# Patient Record
Sex: FEMALE | Race: WHITE
[De-identification: names, ages, dates, MRNs, and addresses within clinical notes are randomized per-mention and may not be internally consistent; named-entity substitution may affect disease eponyms.]

---

## 2017-07-19 ENCOUNTER — HOSPITAL ENCOUNTER (OUTPATIENT)
Dept: HOSPITAL 45 - C.RADBC | Age: 72
Discharge: HOME | End: 2017-07-19
Attending: NURSE PRACTITIONER
Payer: COMMERCIAL

## 2017-07-19 DIAGNOSIS — Z87.09: Primary | ICD-10-CM

## 2017-07-19 DIAGNOSIS — R05: ICD-10-CM

## 2017-07-19 NOTE — DIAGNOSTIC IMAGING REPORT
CHEST 2 VIEWS ROUTINE



CLINICAL HISTORY: 72 years-old Female presenting with COUGH, RECENT URI. 



TECHNIQUE: PA and lateral views of the chest were obtained.



COMPARISON:  9/19/2016.



FINDINGS:

S shaped scoliotic curvature of the thoracolumbar spine resulting in stable

curvature of the cardiomediastinal silhouette. Surgical clips project over the

right lower lung. Lungs and pleural spaces clear. Lungs mildly hyperinflated.

Radiodensities projecting over the epigastrium may be external to the patient.



IMPRESSION:

1.  No acute cardiopulmonary disease.







Electronically signed by:  Jacob Raymond M.D.

7/19/2017 12:03 PM



Dictated Date/Time:  7/19/2017 12:00 PM

## 2018-03-09 ENCOUNTER — HOSPITAL ENCOUNTER (EMERGENCY)
Dept: HOSPITAL 45 - C.EDB | Age: 73
Discharge: HOME | End: 2018-03-09
Payer: COMMERCIAL

## 2018-03-09 VITALS
HEART RATE: 86 BPM | DIASTOLIC BLOOD PRESSURE: 64 MMHG | TEMPERATURE: 98.6 F | SYSTOLIC BLOOD PRESSURE: 138 MMHG | OXYGEN SATURATION: 99 %

## 2018-03-09 VITALS
WEIGHT: 128.97 LBS | BODY MASS INDEX: 21.75 KG/M2 | WEIGHT: 128.97 LBS | HEIGHT: 64.49 IN | HEIGHT: 64.49 IN | BODY MASS INDEX: 21.75 KG/M2

## 2018-03-09 DIAGNOSIS — Z87.891: ICD-10-CM
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
## 2018-03-09 DIAGNOSIS — Z98.890: ICD-10-CM

## 2018-03-09 DIAGNOSIS — S00.03XA: ICD-10-CM

## 2018-03-09 DIAGNOSIS — S92.354A: Primary | ICD-10-CM

## 2018-03-09 DIAGNOSIS — Z85.3: ICD-10-CM

## 2018-03-09 NOTE — EMERGENCY ROOM VISIT NOTE
ED Visit Note


First contact with patient:  08:07


I have seen and examined this patient with Kaila Amaro and generally agree with 

the treatment plan as discussed.


Problem List


Medical Problems:


(1) Breast cancer


Status: Chronic  





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


Permanent Comment: Soft detected right breast mass


Status post biopsy revealing lobular carcinoma status post lumpectomy and 

sentinel lymph node biopsy stage pT2 pN1aM0 


Estrogen receptor positive, progesterone receptor positive, HER-2/gerardo equivocal


Dose dense chemotherapy 4 cycles of Adriamycin and Cytoxan followed by 4 cycles 

of Taxol treated with Herceptin that continued until February 2015


Status post completion of radiation therapy 06/09/2014 received 5040 cGy








Status: Resolved  





(3) Sinus tachycardia


Status: Chronic  





(4) tenderness back


Status: Chronic  











Current/Historical Medications


Scheduled


, 600 MG PO QAM


Anastrozole (Arimidex), 1 MG PO DAILY


Biotin (Biotin), 5,000 MCG PO QAM


Diltiazem Hcl Coated Beads (Diltiazem Cd), 240 MG PO QAM


Lisinopril (Lisinopril), 20 MG PO DAILY


Multivitamins/Minerals (Mvi With Minerals), 1 TAB PO QAM





Scheduled PRN


Acyclovir (Acyclovir), 1 TAB PO TID PRN


Ibuprofen (Motrin), 600 MG PO TID PRN for Pain


Lorazepam (Ativan), 0.5 MG PO TID PRN for Anxiety





Allergies


Coded Allergies:  


     Bacitracin (Verified  Allergy, Intermediate, erythema, 3/9/18)


     Neomycin (Verified  Allergy, Intermediate, erythema, 3/9/18)


     Polymyxin B (Verified  Allergy, Intermediate, erythema, 3/9/18)


     Codeine (Verified  Allergy, Unknown, ., 3/9/18)


     Adhesives (Verified  Adverse Reaction, Intermediate, PRURITIS, 3/9/18)





Vital Signs











  Date Time  Temp Pulse Resp B/P (MAP) Pulse Ox O2 Delivery O2 Flow Rate FiO2


 


3/9/18 08:02 37.0 86 16 138/64 99 Room Air  











Departure Information


Referrals


Sonny Rosales M.D. (PCP)





Patient Instructions


My [facility]

## 2018-03-09 NOTE — EMERGENCY ROOM VISIT NOTE
History


First contact with patient:  08:07


Chief Complaint:  FALL


Stated Complaint:  FELL AND TWISTED ANKLE, HIT HEAD





History of Present Illness


The patient is a 73 year old female who presents to the Emergency Room with 

complaints of having a fall just prior to arrival.  The patient states that she 

missed the bottom step and twisted her right ankle.  She states she fell down 

and the right side of her head hit her rocking chair.  The patient denies any 

dizziness or visual changes prior to the fall.  The patient denies any loss of 

consciousness.  The patient currently denies any headache, dizziness or visual 

changes.  The patient denies any neck or back pain.  The patient denies any 

head pain.  She is mainly complaining of right ankle pain.  She denies any 

numbness and tingling in her toes.  The patient denies any knee or hip injury.  

The patient takes a baby aspirin daily.  She is not on any other blood thinners.





Review of Systems


10 system review was performed and was negative unless stated otherwise history 

of present illness.





Past Medical/Surgical History


Medical Problems:


(1) Bilateral breast cancer


(2) Breast cancer


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


(4) Sinus tachycardia


(5) tenderness back








Social History


Smoking Status:  Former Smoker





Current/Historical Medications


Scheduled


, 600 MG PO QAM


Anastrozole (Arimidex), 1 MG PO DAILY


Biotin (Biotin), 5,000 MCG PO QAM


Diltiazem Hcl Coated Beads (Diltiazem Cd), 240 MG PO QAM


Lisinopril (Lisinopril), 20 MG PO DAILY


Multivitamins/Minerals (Mvi With Minerals), 1 TAB PO QAM





Scheduled PRN


Acyclovir (Acyclovir), 1 TAB PO TID PRN


Ibuprofen (Motrin), 600 MG PO TID PRN for Pain


Lorazepam (Ativan), 0.5 MG PO TID PRN for Anxiety





Physical Exam


Vital Signs











  Date Time  Temp Pulse Resp B/P (MAP) Pulse Ox O2 Delivery O2 Flow Rate FiO2


 


3/9/18 08:02 37.0 86 16 138/64 99 Room Air  











Physical Exam


GENERAL: 73-year-old white female appears in no acute distress.


MENTAL Status: Alert and oriented 3.


HEAD: Small area of ecchymosis noted to frontal to temporal region.  Remainder 

of head is unremarkable.


EYES: PERRLA.  EOMs intact.


EARS: Canals clear.  TMs without hemotympanum


NECK: Supple, no lymphadenopathy noted.  No carotid bruits noted.


LUNGS: Clear auscultation without wheezes rales or rhonchi.


CARDIAC: Regular rate and rhythm without murmur.  Pulses is full and equal 

throughout.


SPINE: Entire spine nontender to palpation.


NEURO:Cranial nerves two through 12 intact. Cerebellar function intact with 

finger-to-nose. Fine motor intact with alternating finger motions.


RIGHT ANKLE: No gross bony deformity noted.  Edema noted over the lateral 

malleoli where she is tender to palpation.  She is also tender to palpation 

over the anterior aspect of the ankle joint.  Limited range of motion secondary 

to pain.  Sensation is intact.





Medical Decision & Procedures


ER Provider


Diagnostic Interpretation:


RIGHT ANKLE 3 VIEWS





HISTORY: Right ankle pain.  Fall/ankle injury





COMPARISON: None.





FINDINGS: Mildly displaced fracture at the distal shaft of the fifth metatarsal.


Small old avulsion fracture at the dorsal aspect of the anterior talus. The


distal tibia and fibula are intact. Small plantar heel spur. Soft tissue


swelling within the ankle. No dislocation.     No radiopaque foreign bodies.





IMPRESSION:  





1. Mildly displaced fracture at the distal shaft of the fifth metatarsal.


2. No acute fracture or dislocation within the right ankle.


3. Small old avulsion fracture at the dorsal aspect of the anterior talus.











Electronically signed by:  Joesph Huerta M.D.


3/9/2018 8:47 AM








CT SCAN OF THE BRAIN WITHOUT IV CONTRAST





CLINICAL HISTORY: Fall.





COMPARISON STUDY:  No priors.





TECHNIQUE: Unenhanced axial CT scan of the brain is performed from the vertex to


the skull base. A dose lowering technique was utilized adhering to the


principles of ALARA. 





CT DOSE: 614.27 mGy.cm





FINDINGS:





Brain parenchyma: There are age-related involutional changes noting  minimal


subcortical and periventricular microangiopathic change. There is no hemorrhage,


mass effect, or evidence of acute territorial ischemia by CT criteria.


Gray-white matter is preserved. No extra-axial fluid collection is seen.





Ventricles, sulci, cisterns: Prominent secondary to involutional change.





Intracranial vasculature: There is mild atherosclerotic calcification of the


cavernous carotid arteries.





Calvarium: The skeletal structures are osteopenic. There is no depressed


calvarial fracture.





Soft tissues: There is minimal right frontoparietal scalp contusion.





Sinuses and mastoids: The visualized paranasal sinuses are clear. The mastoid


air cells are well pneumatized.





Orbits: The bony orbits are grossly intact. There are bilateral ocular lens


implants.








IMPRESSION: There is no hemorrhage, mass effect, or evidence of acute


territorial ischemia by CT criteria.











Electronically signed by:  Surya Zurita M.D.


3/9/2018 8:29 AM





ED Course


The patient was evaluated.  Patient declined any pain medication.  X-ray of the 

right ankle was ordered and interpreted by the radiologist the x-ray revealed a 

fracture of the distal right fifth metatarsal..  A CT of the head was ordered 

interpreted by the radiologist as above without any acute findings.  The 

patient was informed of all findings.  The patient was independently evaluated 

by Dr. Miranda who agrees with treatment plan.  The patient was placed in a 

postop shoe and given a walker.  The  called Dr. Groves's 

office to see if they could see her today in the office.  She has been seen by 

them in the past.  They were willing to see the patient immediately therefore 

she was discharged with instructions to go directly to their office.





Medical Decision


Differential diagnosis include intracranial bleed, head contusion, concussion





Differential diagnosis include ankle fracture, blood fracture, foot contusion, 

ankle sprain





[place] Drug Monitoring Program


Search Results:  patient reviewed within database





Medication Reconcilliation


Current Medication List:  was personally reviewed by me





Blood Pressure Screening


Patient's blood pressure:  Normal blood pressure





Impression





 Primary Impression:  


 Fracture of fifth metatarsal bone of right foot


 Additional Impression:  


 Head contusion





Departure Information


Dispostion


Home / Self-Care





Condition


GOOD





Referrals


Sonny Rosales M.D. (PCP)








Leopoldo Lang M.D.





Forms


HOME CARE DOCUMENTATION FORM,                                                 

               IMPORTANT VISIT INFORMATION





Patient Instructions


My [facility]





Additional Instructions





Go directly to Dr. Groves's office for further evaluation and 

treatment.





Problem Qualifiers








 Primary Impression:  


 Fracture of fifth metatarsal bone of right foot


 Encounter type:  initial encounter  Fracture type:  closed  Fracture alignment

:  nondisplaced  Qualified Codes:  S92.354A - Nondisplaced fracture of fifth 

metatarsal bone, right foot, initial encounter for closed fracture


 Additional Impression:  


 Head contusion


 Encounter type:  initial encounter  Contusion of head detail:  scalp  

Qualified Codes:  S00.03XA - Contusion of scalp, initial encounter

## 2018-03-09 NOTE — DIAGNOSTIC IMAGING REPORT
CT SCAN OF THE BRAIN WITHOUT IV CONTRAST



CLINICAL HISTORY: Fall.



COMPARISON STUDY:  No priors.



TECHNIQUE: Unenhanced axial CT scan of the brain is performed from the vertex to

the skull base. A dose lowering technique was utilized adhering to the

principles of ALARA. 



CT DOSE: 614.27 mGy.cm



FINDINGS:



Brain parenchyma: There are age-related involutional changes noting  minimal

subcortical and periventricular microangiopathic change. There is no hemorrhage,

mass effect, or evidence of acute territorial ischemia by CT criteria.

Gray-white matter is preserved. No extra-axial fluid collection is seen.



Ventricles, sulci, cisterns: Prominent secondary to involutional change.



Intracranial vasculature: There is mild atherosclerotic calcification of the

cavernous carotid arteries.



Calvarium: The skeletal structures are osteopenic. There is no depressed

calvarial fracture.



Soft tissues: There is minimal right frontoparietal scalp contusion.



Sinuses and mastoids: The visualized paranasal sinuses are clear. The mastoid

air cells are well pneumatized.



Orbits: The bony orbits are grossly intact. There are bilateral ocular lens

implants.





IMPRESSION: There is no hemorrhage, mass effect, or evidence of acute

territorial ischemia by CT criteria.







Electronically signed by:  Surya Zurita M.D.

3/9/2018 8:29 AM



Dictated Date/Time:  3/9/2018 8:26 AM

## 2018-03-09 NOTE — DIAGNOSTIC IMAGING REPORT
RIGHT ANKLE 3 VIEWS



HISTORY: Right ankle pain.  Fall/ankle injury



COMPARISON: None.



FINDINGS: Mildly displaced fracture at the distal shaft of the fifth metatarsal.

Small old avulsion fracture at the dorsal aspect of the anterior talus. The

distal tibia and fibula are intact. Small plantar heel spur. Soft tissue

swelling within the ankle. No dislocation.     No radiopaque foreign bodies.



IMPRESSION:  



1. Mildly displaced fracture at the distal shaft of the fifth metatarsal.

2. No acute fracture or dislocation within the right ankle.

3. Small old avulsion fracture at the dorsal aspect of the anterior talus.







Electronically signed by:  Joesph Huerta M.D.

3/9/2018 8:47 AM



Dictated Date/Time:  3/9/2018 8:44 AM